# Patient Record
Sex: FEMALE | Race: BLACK OR AFRICAN AMERICAN | NOT HISPANIC OR LATINO | URBAN - METROPOLITAN AREA
[De-identification: names, ages, dates, MRNs, and addresses within clinical notes are randomized per-mention and may not be internally consistent; named-entity substitution may affect disease eponyms.]

---

## 2020-10-07 ENCOUNTER — EMERGENCY (EMERGENCY)
Facility: HOSPITAL | Age: 45
LOS: 0 days | Discharge: HOME | End: 2020-10-07
Payer: COMMERCIAL

## 2020-10-07 VITALS
TEMPERATURE: 98 F | OXYGEN SATURATION: 99 % | RESPIRATION RATE: 20 BRPM | DIASTOLIC BLOOD PRESSURE: 77 MMHG | HEIGHT: 66 IN | WEIGHT: 199.96 LBS | HEART RATE: 101 BPM | SYSTOLIC BLOOD PRESSURE: 155 MMHG

## 2020-10-07 DIAGNOSIS — M54.9 DORSALGIA, UNSPECIFIED: ICD-10-CM

## 2020-10-07 DIAGNOSIS — M25.571 PAIN IN RIGHT ANKLE AND JOINTS OF RIGHT FOOT: ICD-10-CM

## 2020-10-07 DIAGNOSIS — S16.1XXA STRAIN OF MUSCLE, FASCIA AND TENDON AT NECK LEVEL, INITIAL ENCOUNTER: ICD-10-CM

## 2020-10-07 DIAGNOSIS — R51.9 HEADACHE, UNSPECIFIED: ICD-10-CM

## 2020-10-07 DIAGNOSIS — S09.90XA UNSPECIFIED INJURY OF HEAD, INITIAL ENCOUNTER: ICD-10-CM
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
## 2020-10-07 PROCEDURE — 99285 EMERGENCY DEPT VISIT HI MDM: CPT

## 2020-10-07 PROCEDURE — 70450 CT HEAD/BRAIN W/O DYE: CPT | Mod: 26,QQ

## 2020-10-07 PROCEDURE — 72125 CT NECK SPINE W/O DYE: CPT | Mod: 26,QQ

## 2020-10-07 PROCEDURE — 73610 X-RAY EXAM OF ANKLE: CPT | Mod: 26,RT

## 2020-10-07 RX ORDER — IBUPROFEN 200 MG
1 TABLET ORAL
Qty: 15 | Refills: 0
Start: 2020-10-07 | End: 2020-10-11

## 2020-10-07 RX ORDER — CYCLOBENZAPRINE HYDROCHLORIDE 10 MG/1
5 TABLET, FILM COATED ORAL ONCE
Refills: 0 | Status: COMPLETED | OUTPATIENT
Start: 2020-10-07 | End: 2020-10-07

## 2020-10-07 RX ORDER — CYCLOBENZAPRINE HYDROCHLORIDE 10 MG/1
1 TABLET, FILM COATED ORAL
Qty: 20 | Refills: 0
Start: 2020-10-07 | End: 2020-10-16

## 2020-10-07 RX ORDER — KETOROLAC TROMETHAMINE 30 MG/ML
60 SYRINGE (ML) INJECTION ONCE
Refills: 0 | Status: DISCONTINUED | OUTPATIENT
Start: 2020-10-07 | End: 2020-10-07

## 2020-10-07 RX ADMIN — Medication 60 MILLIGRAM(S): at 22:18

## 2020-10-07 RX ADMIN — CYCLOBENZAPRINE HYDROCHLORIDE 5 MILLIGRAM(S): 10 TABLET, FILM COATED ORAL at 22:18

## 2020-10-07 NOTE — ED PROVIDER NOTE - CLINICAL SUMMARY MEDICAL DECISION MAKING FREE TEXT BOX
rest, pain meds and f/u w orthropedic in 1 week   Discussed results and outcome of testing with the patient.  Patient advised to please follow up with their primary care doctor within the next 24-48 hours and return to the Emergency Department for worsening symptoms or any other concerns.  Patient advised that their doctor may call  to follow up on the specific results of the tests performed today in the emergency department.
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
## 2020-10-07 NOTE — ED PROVIDER NOTE - PATIENT PORTAL LINK FT
Floor
You can access the FollowMyHealth Patient Portal offered by [facility] by registering at the following website: [email or web address]. By joining [employer]’s FollowMyHealth portal, you will also be able to view your health information using other applications (apps) compatible with our system.
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
## 2022-10-28 NOTE — ED ADULT TRIAGE NOTE - LOCATION:
Patient of Dr Celio Huynh, last visit 6/14    She is reporting "crazy" pain in low back since yesterday; actually  started in May; she had  PT in June for 6-8 weeks with mild relief, recd steroid/cortisone shot and then had fall which worsened pain  MRI  showed: ,IMPRESSION:  Mild L5-S1 degenerative change resulting in mild right foraminal stenosis and possible impingement of exiting right L5 nerve root  No focal disc herniation throughout the lumbar spine  No evidence of critical stenosis  xray showed herniated disc with stenosis and RA,    She had office visit with pain management 10/24, epidural shot scheduled 11/9  (note in chart)  She said pain management also suggested she call neurology  I suggested she notify pain management that pain has worsened to possible move up epidural or for other recommendations as they are overseeing lumbar radiculopathy    Meds:  Nortriptyline, propranolol, celebrex    Please provide input    thanks for your help    [phone] Right arm;

## 2023-08-22 NOTE — ED ADULT NURSE NOTE - NURSING ED SKIN COLOR
LOV 08/11/2023  
Refill Routing Note   Medication(s) are not appropriate for processing by [facility] for the following reason(s):      Non-participating provider    [facility] action(s):  Route Care Due:  None identified            Appointments  past 12m or future 3m with PCP    Date Provider   Last Visit   8/11/2023 Dora Flowers MD   Next Visit   Visit date not found Dora Flowers MD   ED visits in past 90 days: 0        Note composed:10:07 AM 08/22/2023          
normal for race